# Patient Record
Sex: MALE | Race: OTHER | HISPANIC OR LATINO | URBAN - METROPOLITAN AREA
[De-identification: names, ages, dates, MRNs, and addresses within clinical notes are randomized per-mention and may not be internally consistent; named-entity substitution may affect disease eponyms.]

---

## 2019-10-22 ENCOUNTER — EMERGENCY (EMERGENCY)
Facility: HOSPITAL | Age: 24
LOS: 1 days | Discharge: HOME | End: 2019-10-22
Attending: STUDENT IN AN ORGANIZED HEALTH CARE EDUCATION/TRAINING PROGRAM
Payer: SELF-PAY

## 2019-10-22 VITALS
OXYGEN SATURATION: 98 % | TEMPERATURE: 97 F | DIASTOLIC BLOOD PRESSURE: 65 MMHG | HEART RATE: 64 BPM | SYSTOLIC BLOOD PRESSURE: 129 MMHG | RESPIRATION RATE: 17 BRPM | HEIGHT: 70 IN | WEIGHT: 149.91 LBS

## 2019-10-22 PROCEDURE — 87591 N.GONORRHOEAE DNA AMP PROB: CPT

## 2019-10-22 PROCEDURE — 99285 EMERGENCY DEPT VISIT HI MDM: CPT

## 2019-10-22 PROCEDURE — 93005 ELECTROCARDIOGRAM TRACING: CPT

## 2019-10-22 PROCEDURE — 99283 EMERGENCY DEPT VISIT LOW MDM: CPT | Mod: 25

## 2019-10-22 PROCEDURE — 87491 CHLMYD TRACH DNA AMP PROBE: CPT

## 2019-10-22 RX ORDER — MECLIZINE HCL 12.5 MG
1 TABLET ORAL
Qty: 21 | Refills: 0
Start: 2019-10-22 | End: 2019-10-28

## 2019-10-22 RX ORDER — MECLIZINE HCL 12.5 MG
25 TABLET ORAL ONCE
Refills: 0 | Status: COMPLETED | OUTPATIENT
Start: 2019-10-22 | End: 2019-10-22

## 2019-10-22 RX ADMIN — Medication 25 MILLIGRAM(S): at 17:30

## 2019-10-22 NOTE — ED PROVIDER NOTE - OBJECTIVE STATEMENT
24 y.o presenting with vertigo, room spinning sensation x 1.5 yr. endorses worse in the past week. denies medication use, chest pain, sob, n, v, abd pain. endorses that he has left ear tinnitus. denies focal weakness, numbness. also endorse that he wanted his urine tested for std. denies penile discharge or pain. endorses that he does not want HIV testing.

## 2019-10-22 NOTE — ED PROVIDER NOTE - CLINICAL SUMMARY MEDICAL DECISION MAKING FREE TEXT BOX
Patient presenting with vertigo, history of similar in the past,. neuro intact. will treat vertigo, ekg, ro acs. ed obs and reassess

## 2019-10-22 NOTE — ED PROVIDER NOTE - PATIENT PORTAL LINK FT
You can access the FollowMyHealth Patient Portal offered by [facility] by registering at the following website: [email or web address]. By joining [employer]’s FollowMyHealth portal, you will also be able to view your health information using other applications (apps) compatible with our system.

## 2019-10-23 LAB
C TRACH RRNA SPEC QL NAA+PROBE: SIGNIFICANT CHANGE UP
N GONORRHOEA RRNA SPEC QL NAA+PROBE: SIGNIFICANT CHANGE UP
SPECIMEN SOURCE: SIGNIFICANT CHANGE UP

## 2022-10-05 ENCOUNTER — EMERGENCY (EMERGENCY)
Facility: HOSPITAL | Age: 27
LOS: 1 days | Discharge: HOME | End: 2022-10-05
Attending: EMERGENCY MEDICINE
Payer: COMMERCIAL

## 2022-10-05 VITALS
HEIGHT: 69 IN | TEMPERATURE: 99 F | HEART RATE: 61 BPM | DIASTOLIC BLOOD PRESSURE: 68 MMHG | WEIGHT: 149.91 LBS | OXYGEN SATURATION: 100 % | RESPIRATION RATE: 18 BRPM | SYSTOLIC BLOOD PRESSURE: 106 MMHG

## 2022-10-05 VITALS
TEMPERATURE: 99 F | RESPIRATION RATE: 18 BRPM | SYSTOLIC BLOOD PRESSURE: 99 MMHG | DIASTOLIC BLOOD PRESSURE: 56 MMHG | OXYGEN SATURATION: 99 % | HEART RATE: 60 BPM

## 2022-10-05 PROCEDURE — 73080 X-RAY EXAM OF ELBOW: CPT

## 2022-10-05 PROCEDURE — 73090 X-RAY EXAM OF FOREARM: CPT

## 2022-10-05 PROCEDURE — 73090 X-RAY EXAM OF FOREARM: CPT | Mod: 26,RT

## 2022-10-05 PROCEDURE — 24650 CLTX RDL HEAD/NCK FX WO MNPJ: CPT | Mod: 54

## 2022-10-05 PROCEDURE — 73060 X-RAY EXAM OF HUMERUS: CPT

## 2022-10-05 PROCEDURE — 99284 EMERGENCY DEPT VISIT MOD MDM: CPT | Mod: 25

## 2022-10-05 PROCEDURE — 73110 X-RAY EXAM OF WRIST: CPT

## 2022-10-05 PROCEDURE — 73060 X-RAY EXAM OF HUMERUS: CPT | Mod: 26,RT

## 2022-10-05 PROCEDURE — 73080 X-RAY EXAM OF ELBOW: CPT | Mod: 26,RT

## 2022-10-05 PROCEDURE — 73110 X-RAY EXAM OF WRIST: CPT | Mod: 26,RT

## 2022-10-05 PROCEDURE — 99284 EMERGENCY DEPT VISIT MOD MDM: CPT | Mod: 57

## 2022-10-05 RX ORDER — ACETAMINOPHEN 500 MG
650 TABLET ORAL ONCE
Refills: 0 | Status: COMPLETED | OUTPATIENT
Start: 2022-10-05 | End: 2022-10-05

## 2022-10-05 RX ADMIN — Medication 650 MILLIGRAM(S): at 16:59
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
## 2022-10-05 NOTE — ED PROVIDER NOTE - NSFOLLOWUPINSTRUCTIONS_ED_ALL_ED_FT
YOU WERE SEEN IN THE ED FOR: right upper extremity pain.    WHILE YOU WERE HERE, YOU HAD: repeat xrays of your right humerus, elbow, forearm and wrist.  You were found to have a suspected fracture of the radial aspect of the radial head.  This is the preliminary result of your Xray.  If there is any change between the preliminary result and the final result, you will be contacted.    FOR PAIN, YOU MAY TAKE TYLENOL (Acetaminophen) AND/OR IBUPROFEN (Advil or Motrin). FOLLOW THE INSTRUCTIONS ON THE LABEL/CONTAINER.  While here you received 650mg of Tylenol. Do not exceed 4000 mg of Tylenol in a 24 hour period.      PLEASE FOLLOW UP WITH ORTHOPEDIST.  PLEASE CALL FIRST THING TOMORROW TO SET UP FOLLOW UP.  BRING COPIES OF YOUR RESULTS.    RETURN TO THE EMERGENCY DEPARTMENT IF YOU EXPERIENCE ANY NEW/CONCERNING/WORSENING SYMPTOMS SUCH AS BUT NOT LIMITED TO: YOU WERE SEEN IN THE ED FOR: right upper extremity pain.    WHILE YOU WERE HERE, YOU HAD: repeat xrays of your right humerus, elbow, forearm and wrist.  You were found to have a suspected fracture of the radial aspect of the radial head.  This is the preliminary result of your Xray.  If there is any change between the preliminary result and the final result, you will be contacted.    FOR PAIN, YOU MAY TAKE TYLENOL (Acetaminophen) AND/OR IBUPROFEN (Advil or Motrin). FOLLOW THE INSTRUCTIONS ON THE LABEL/CONTAINER.  While here you received 650mg of Tylenol. Do not exceed 4000 mg of Tylenol in a 24 hour period.      PLEASE FOLLOW UP WITH ORTHOPEDIST.  PLEASE CALL FIRST THING TOMORROW TO SET UP FOLLOW UP.  BRING COPIES OF YOUR RESULTS.    RETURN TO THE EMERGENCY DEPARTMENT IF YOU EXPERIENCE ANY NEW/CONCERNING/WORSENING SYMPTOMS SUCH AS BUT NOT LIMITED TO: severe pain, numbness, tingling, weakness.
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
## 2022-10-05 NOTE — ED PROVIDER NOTE - NSFOLLOWUPCLINICS_GEN_ALL_ED_FT
[facility] Orthopedic Surgery  Orthopedic Surgery  [address], 3rd & 4th [place] [address]  Phone: [phone]  Fax:     [facility]  Orthopedic Surgery  [address] [address]  Phone: [phone]  Fax:      [facility] Orthopedic Surgery  Orthopedic Surgery  [address], 3rd & 4th [place] [address]  Phone: [phone]  Fax:     [facility]  Orthopedic Surgery  [address] [address]  Phone: [phone]  Fax:      [facility] Orthopedic Surgery  Orthopedic Surgery  [address], 3rd & 4th [place] [address]  Phone: [phone]  Fax:     [facility]  Orthopedic Surgery  [address] [address]  Phone: [phone]  Fax:
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
## 2022-10-05 NOTE — ED PROVIDER NOTE - NSPTACCESSSVCSAPPT_ED_ALL_ED
Refilled times 1 but needs appointment before the next prescription will be filled.  Please call patient and set up a physical exam. Specialty Care (Immediate)...

## 2022-10-05 NOTE — ED PROVIDER NOTE - PROGRESS NOTE DETAILS
Attending MD Quinteros: XRays noted, results discussed with patient, will place in sling and give Ortho follow up.  Stable for discharge. Follow up instructions given, importance of follow up emphasized, return to ED parameters reviewed and patient verbalized understanding.  All questions answered, all concerns addressed. Tam, PGY4 – x-ray with radial head fx, pt placed in sling, instructed to flex/extend elbow a few times per day to prevent elbow stiffness, knows to f/u with orthopedic surgeon.  Results were discussed with patient as well as return precautions and follow up plan with PCP and/or specialist. Time was taken to answer any questions that the patient had before providing them with discharge paperwork.

## 2022-10-05 NOTE — ED PROVIDER NOTE - ADDITIONAL NOTES AND INSTRUCTIONS:
Mr. Isabel was evaluated in the ER. He is to follow up with an orthopedic surgeon for further recommendations.    -Dr. Jennifer Turcios

## 2022-10-05 NOTE — ED ADULT NURSE NOTE - NSIMPLEMENTINTERV_GEN_ALL_ED
Implemented All Universal Safety Interventions:  [place] to call system. Call bell, personal items and telephone within reach. Instruct patient to call for assistance. Room bathroom lighting operational. Non-slip footwear when patient is off stretcher. Physically safe environment: no spills, clutter or unnecessary equipment. Stretcher in lowest position, wheels locked, appropriate side rails in place. Implemented All Universal Safety Interventions:  [place] to call system. Call bell, personal items and telephone within reach. Instruct patient to call for assistance. Room bathroom lighting operational. Non-slip footwear when patient is off stretcher. Physically safe environment: no spills, clutter or unnecessary equipment. Stretcher in lowest position, wheels locked, appropriate side rails in place. Implemented All Universal Safety Interventions:  [place] to call system. Call bell, personal items and telephone within reach. Instruct patient to call for assistance. Room bathroom lighting operational. Non-slip footwear when patient is off stretcher. Physically safe environment: no spills, clutter or unnecessary equipment. Stretcher in lowest position, wheels locked, appropriate side rails in place.
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
## 2022-10-05 NOTE — ED PROVIDER NOTE - PHYSICAL EXAMINATION
I have reviewed the triage vital signs.  Const: AAOx3, in NAD  Eyes: no conjunctival injection  HENT: NCAT, Neck supple, oral mucosa moist  CV: RRR, +S1, S2  Resp: CTAB, no respiratory distress  GI: Abdomen soft, NTND, no guarding  : no CVA tenderness  Extremities: No peripheral edema  Skin: Warm, well perfused, no rash  MSK: No gross deformities appreciated. No bony TTP. No AC/SC joint TTP. No snuffbox/scaphoid tubercle, no pain with axial loading of R thumb. RUE compartments soft. R Bicep contour appears normal/symmetric to L bicep. Pt appears to have pain with RUE supination & elbow flexion. Strength grossly normal, but limited by pain.  Neuro: No focal sensory or motor deficits  Psych: Appropriate mood and affect
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
## 2022-10-05 NOTE — ED PROVIDER NOTE - PATIENT PORTAL LINK FT
You can access the FollowMyHealth Patient Portal offered by [facility] by registering at the following website: [email or web address]. By joining [employer]’s FollowMyHealth portal, you will also be able to view your health information using other applications (apps) compatible with our system. You can access the FollowMyHealth Patient Portal offered by [facility] by registering at the following website: [email or web address]. By joining [employer]’s FollowMyHealth portal, you will also be able to view your health information using other applications (apps) compatible with our system. You can access the FollowMyHealth Patient Portal offered by [facility] by registering at the following website: [email or web address]. By joining [employer]’s FollowMyHealth portal, you will also be able to view your health information using other applications (apps) compatible with our system.

## 2023-03-19 ENCOUNTER — EMERGENCY (EMERGENCY)
Facility: HOSPITAL | Age: 28
LOS: 1 days | Discharge: HOME | End: 2023-03-19
Attending: EMERGENCY MEDICINE
Payer: COMMERCIAL

## 2023-03-19 VITALS
HEIGHT: 70 IN | TEMPERATURE: 98 F | OXYGEN SATURATION: 100 % | SYSTOLIC BLOOD PRESSURE: 124 MMHG | HEART RATE: 89 BPM | RESPIRATION RATE: 18 BRPM | WEIGHT: 149.91 LBS | DIASTOLIC BLOOD PRESSURE: 58 MMHG

## 2023-03-19 DIAGNOSIS — Z98.890 OTHER SPECIFIED POSTPROCEDURAL STATES: Chronic | ICD-10-CM

## 2023-03-19 LAB
ACETONE SERPL-MCNC: NEGATIVE — SIGNIFICANT CHANGE UP
ALBUMIN SERPL ELPH-MCNC: 4.2 G/DL — SIGNIFICANT CHANGE UP (ref 3.5–5)
ALP SERPL-CCNC: 53 U/L — SIGNIFICANT CHANGE UP (ref 40–120)
ALT FLD-CCNC: 32 U/L DA — SIGNIFICANT CHANGE UP (ref 10–60)
ANION GAP SERPL CALC-SCNC: 11 MMOL/L — SIGNIFICANT CHANGE UP (ref 5–17)
AST SERPL-CCNC: 22 U/L — SIGNIFICANT CHANGE UP (ref 10–40)
BASOPHILS # BLD AUTO: 0.08 K/UL — SIGNIFICANT CHANGE UP (ref 0–0.2)
BASOPHILS NFR BLD AUTO: 0.8 % — SIGNIFICANT CHANGE UP (ref 0–2)
BILIRUB SERPL-MCNC: 1 MG/DL — SIGNIFICANT CHANGE UP (ref 0.2–1.2)
BUN SERPL-MCNC: 14 MG/DL — SIGNIFICANT CHANGE UP (ref 7–18)
CALCIUM SERPL-MCNC: 9.3 MG/DL — SIGNIFICANT CHANGE UP (ref 8.4–10.5)
CHLORIDE SERPL-SCNC: 104 MMOL/L — SIGNIFICANT CHANGE UP (ref 96–108)
CK SERPL-CCNC: 244 U/L — HIGH (ref 35–232)
CO2 SERPL-SCNC: 25 MMOL/L — SIGNIFICANT CHANGE UP (ref 22–31)
CREAT SERPL-MCNC: 0.99 MG/DL — SIGNIFICANT CHANGE UP (ref 0.5–1.3)
EGFR: 107 ML/MIN/1.73M2 — SIGNIFICANT CHANGE UP
EOSINOPHIL # BLD AUTO: 0.06 K/UL — SIGNIFICANT CHANGE UP (ref 0–0.5)
EOSINOPHIL NFR BLD AUTO: 0.6 % — SIGNIFICANT CHANGE UP (ref 0–6)
GLUCOSE SERPL-MCNC: 106 MG/DL — HIGH (ref 70–99)
HCT VFR BLD CALC: 48 % — SIGNIFICANT CHANGE UP (ref 39–50)
HGB BLD-MCNC: 16.4 G/DL — SIGNIFICANT CHANGE UP (ref 13–17)
IMM GRANULOCYTES NFR BLD AUTO: 0.5 % — SIGNIFICANT CHANGE UP (ref 0–0.9)
LYMPHOCYTES # BLD AUTO: 5.45 K/UL — HIGH (ref 1–3.3)
LYMPHOCYTES # BLD AUTO: 57.4 % — HIGH (ref 13–44)
MAGNESIUM SERPL-MCNC: 2 MG/DL — SIGNIFICANT CHANGE UP (ref 1.6–2.6)
MCHC RBC-ENTMCNC: 31.3 PG — SIGNIFICANT CHANGE UP (ref 27–34)
MCHC RBC-ENTMCNC: 34.2 GM/DL — SIGNIFICANT CHANGE UP (ref 32–36)
MCV RBC AUTO: 91.6 FL — SIGNIFICANT CHANGE UP (ref 80–100)
MONOCYTES # BLD AUTO: 0.92 K/UL — HIGH (ref 0–0.9)
MONOCYTES NFR BLD AUTO: 9.7 % — SIGNIFICANT CHANGE UP (ref 2–14)
NEUTROPHILS # BLD AUTO: 2.94 K/UL — SIGNIFICANT CHANGE UP (ref 1.8–7.4)
NEUTROPHILS NFR BLD AUTO: 31 % — LOW (ref 43–77)
NRBC # BLD: 0 /100 WBCS — SIGNIFICANT CHANGE UP (ref 0–0)
PLATELET # BLD AUTO: 242 K/UL — SIGNIFICANT CHANGE UP (ref 150–400)
POTASSIUM SERPL-MCNC: 3.2 MMOL/L — LOW (ref 3.5–5.3)
POTASSIUM SERPL-SCNC: 3.2 MMOL/L — LOW (ref 3.5–5.3)
PROT SERPL-MCNC: 7 G/DL — SIGNIFICANT CHANGE UP (ref 6–8.3)
RBC # BLD: 5.24 M/UL — SIGNIFICANT CHANGE UP (ref 4.2–5.8)
RBC # FLD: 12.9 % — SIGNIFICANT CHANGE UP (ref 10.3–14.5)
SODIUM SERPL-SCNC: 140 MMOL/L — SIGNIFICANT CHANGE UP (ref 135–145)
TROPONIN I, HIGH SENSITIVITY RESULT: 3 NG/L — SIGNIFICANT CHANGE UP
WBC # BLD: 9.79 K/UL — SIGNIFICANT CHANGE UP (ref 3.8–10.5)
WBC # FLD AUTO: 9.79 K/UL — SIGNIFICANT CHANGE UP (ref 3.8–10.5)

## 2023-03-19 PROCEDURE — 99285 EMERGENCY DEPT VISIT HI MDM: CPT

## 2023-03-19 RX ORDER — SODIUM CHLORIDE 9 MG/ML
2000 INJECTION INTRAMUSCULAR; INTRAVENOUS; SUBCUTANEOUS ONCE
Refills: 0 | Status: COMPLETED | OUTPATIENT
Start: 2023-03-19 | End: 2023-03-19

## 2023-03-19 RX ORDER — MECLIZINE HCL 12.5 MG
12.5 TABLET ORAL ONCE
Refills: 0 | Status: DISCONTINUED | OUTPATIENT
Start: 2023-03-19 | End: 2023-03-19

## 2023-03-19 RX ORDER — METOCLOPRAMIDE HCL 10 MG
10 TABLET ORAL ONCE
Refills: 0 | Status: COMPLETED | OUTPATIENT
Start: 2023-03-19 | End: 2023-03-19

## 2023-03-19 RX ORDER — ONDANSETRON 8 MG/1
4 TABLET, FILM COATED ORAL ONCE
Refills: 0 | Status: COMPLETED | OUTPATIENT
Start: 2023-03-19 | End: 2023-03-19

## 2023-03-19 RX ADMIN — Medication 10 MILLIGRAM(S): at 20:27

## 2023-03-19 RX ADMIN — Medication 1 MILLIGRAM(S): at 19:03

## 2023-03-19 RX ADMIN — Medication 104 MILLIGRAM(S): at 19:57

## 2023-03-19 RX ADMIN — SODIUM CHLORIDE 2000 MILLILITER(S): 9 INJECTION INTRAMUSCULAR; INTRAVENOUS; SUBCUTANEOUS at 20:03

## 2023-03-19 RX ADMIN — ONDANSETRON 4 MILLIGRAM(S): 8 TABLET, FILM COATED ORAL at 19:57

## 2023-03-19 RX ADMIN — SODIUM CHLORIDE 2000 MILLILITER(S): 9 INJECTION INTRAMUSCULAR; INTRAVENOUS; SUBCUTANEOUS at 19:03

## 2023-03-19 NOTE — ED PROVIDER NOTE - PATIENT PORTAL LINK FT
You can access the FollowMyHealth Patient Portal offered by [facility] by registering at the following website: [email or web address]. By joining [employer]’s FollowMyHealth portal, you will also be able to view your health information using other applications (apps) compatible with our system.

## 2023-03-19 NOTE — ED PROVIDER NOTE - CARE PLAN
1 Principal Discharge DX:	Vertigo  Secondary Diagnosis:	Panic attack   Principal Discharge DX:	Vertigo  Secondary Diagnosis:	Panic attack  Secondary Diagnosis:	Hypokalemia

## 2023-03-19 NOTE — ED PROVIDER NOTE - NEUROLOGICAL, MLM
Alert and oriented, no focal deficits, no motor or sensory deficits.  unable to get up, feeling very dizzy

## 2023-03-19 NOTE — ED PROVIDER NOTE - NSFOLLOWUPINSTRUCTIONS_ED_ALL_ED_FT
Benign Paroxysmal Positional Vertigo    WHAT YOU NEED TO KNOW:    BPPV is an inner ear condition that causes you to suddenly feel dizzy. Benign means it is not serious or life-threatening. BPPV is caused by a problem with the nerves and structure of your inner ear. BPPV happens when small pieces of calcium break loose and lump together in one of your inner ear canals. Ear Anatomy         DISCHARGE INSTRUCTIONS:    Return to the emergency department if:     You fall during a BPPV episode and are injured.      You have a severe headache that does not go away.      You have new changes in your vision or feel weak or confused.      You have problems hearing, or you have ringing or buzzing in your ears.    Contact your healthcare provider if:     Your BPPV symptoms do not go away or they return.      You have problems with your balance, or you are falling often.      You have new or increased nausea or vomiting with vertigo.      You feel anxious or depressed and do not want to leave your home.      You have questions or concerns about your condition or care.    Medicines:     Medicines may be recommended or prescribed to treat dizziness or nausea.      Take your medicine as directed. Contact your healthcare provider if you think your medicine is not helping or if you have side effects. Tell him of her if you are allergic to any medicine. Keep a list of the medicines, vitamins, and herbs you take. Include the amounts, and when and why you take them. Bring the list or the pill bottles to follow-up visits. Carry your medicine list with you in case of an emergency.    Prevent your symptoms:     Try to avoid sudden head movements. Stand up and lie down slowly.       Raise and support your head when you lie down. Place pillows under your upper back and head or rest in a recliner.       Change your position often when you are lying down. Try not to lie with your head on the same side for long periods of time. Roll over slowly.       Wear protective gear when you ride a bike or play sports. A helmet helps protect your head from injury.    Follow up with your healthcare provider as directed: You may need to return in 1 month to check the progress of your treatment. Write down your questions so you remember to ask them during your visits.        Panic Attack      A panic attack is a sudden episode of severe anxiety, fear, or discomfort that causes physical and emotional symptoms. A panic attack may be in response to something frightening, or it may occur for no known reason.    Symptoms of a panic attack can be similar to symptoms of a heart attack or stroke. It is important to see your health care provider when you have a panic attack so that these conditions can be ruled out.      What are the causes?    A panic attack may be caused by:  •An extreme, life-threatening situation, such as a war or natural disaster.      •An anxiety disorder, such as post-traumatic stress disorder.      •Depression.      •Panic disorder.      •Certain medical conditions, including heart problems, neurological conditions, and infections.      Other causes may include:  •Certain over-the-counter and prescription medicines.      •Supplements that increase anxiety.      •Illegal drugs that increase heart rate and blood pressure, such as methamphetamine.        What increases the risk?    You are more likely to develop this condition if:  •You have another mental health condition.      •You use alcohol, illegal drugs, or other substances.      •You are under extreme stress.      •A life event is causing increased feelings of anxiety and depression.        What are the signs or symptoms?    A panic attack starts suddenly, usually lasts 5–10 minutes, and occurs with one or more of the following:  •A pounding heart, or a feeling that your heart is beating irregularly or faster than normal (palpitations).      •Sweating, trembling, or shaking.      •Shortness of breath, feeling smothered, or feeling choked.      •Chest pain or discomfort.      •Nausea or a strange feeling in your stomach.      •Dizziness, feeling light-headed, or feeling like you might faint.      Other symptoms may include:  •Chills or hot flashes.      •Numbness or tingling in your lips, hands, or feet.      •Feeling confused, or feeling that you are not yourself.      •Fear of losing control or of being emotionally unstable, or fear of dying.        How is this diagnosed?  Doctor speaking with a patient in the doctor's office.   A panic attack is diagnosed with an assessment by your health care provider. During the assessment, your health care provider will ask questions about:  •Your history of anxiety, depression, and panic attacks.      •Your medical history.      •Whether you drink alcohol, use drugs, take supplements, or take medicines. Be honest about your substance use.      Your health care provider may also:  •Order blood tests or other kinds of tests to rule out serious medical conditions.      •Refer you to a mental health professional for further evaluation.        How is this treated?    A panic attack is a symptom of another condition. Treatment depends on the cause of the panic attack.  •If the cause is a medical problem, your health care provider will treat that problem or refer you to a specialist.      •If the cause is emotional, you may be given anti-anxiety medicines or referred to a counselor. Anti-anxiety medicines may reduce how often attacks happen, reduce how severe the attacks are, and lower anxiety.      •If the cause is a medicine, your health care provider may tell you to stop the medicine, change your dose, or take a different medicine.      •If the cause is an illegal drug, treatment may involve letting the drug wear off and taking medicine to help the drug leave your body or to stop its effects. Attacks caused by heavy drug use may continue even if you stop using the drug.      Most panic attacks go away with treatment of the underlying problem. If you have panic attacks often, you may have a condition called panic disorder.      Follow these instructions at home:    Alcohol use   • Do not drink alcohol if:  •Your health care provider tells you not to drink.      •You are pregnant, may be pregnant, or are planning to become pregnant.      •If you drink alcohol:•Limit how much you have to:  •0–1 drink a day for women.      •0–2 drinks a day for men.        •Know how much alcohol is in your drink. In the [place] one drink equals one 12 oz bottle of beer (355 mL), one 5 oz glass of wine (148 mL), or one 1½ oz glass of hard liquor (44 mL).        General instructions     •Take over-the-counter and prescription medicines only as told by your health care provider.      •If you feel anxious, limit your caffeine intake.    •Take good care of your physical and mental health by:  •Eating a balanced diet that includes plenty of fresh fruits and vegetables, whole grains, lean meats, and low-fat dairy.      •Getting plenty of rest. Try to get 7–8 hours of uninterrupted sleep each night.      •Exercising regularly. Try to get 30 minutes of physical activity at least 5 days a week.        • Do not use any products that contain nicotine or tobacco. These products include cigarettes, chewing tobacco, and vaping devices, such as e-cigarettes. If you need help quitting, ask your health care provider.      •Keep all follow-up visits. This is important. Panic attacks may have underlying physical or emotional problems that take time to accurately diagnose.        Where to find more information    •Substance Abuse and Mental Health Services Administration (SAMHSA): samhsa.gov      •National [place] of Mental Health ([facility]): www.nimh.nih.gov        Contact a health care provider if:    •Your symptoms do not improve, or they get worse.      •You are not able to take your medicine as prescribed because of side effects.        Get help right away if:    •You have thoughts about hurting yourself or others.      Get help right away if you feel like you may hurt yourself or others, or have thoughts about taking your own life. Go to your nearest emergency room or:   • Call 911.       • Call the National Suicide Prevention Lifeline at 1-747.129.5963 or 983. This is open 24 hours a day.       • Text the Crisis Text Line at 857070.         Summary    •A panic attack is a sudden episode of severe anxiety, fear, or discomfort that causes physical and emotional symptoms.      •Always see a health care provider to have the reasons for the panic attack correctly diagnosed.      •If your panic attack was caused by a physical problem, follow your health care provider's suggestions for medicine, referral to a specialist, and lifestyle changes.      •If your panic attack was caused by an emotional problem, follow through with counseling from a qualified mental health specialist.      •If you feel like you may hurt yourself or others, call 911 and get help right away.      This information is not intended to replace advice given to you by your health care provider. Make sure you discuss any questions you have with your health care provider.      Neurology outpatient referral

## 2023-03-19 NOTE — ED PROVIDER NOTE - OBJECTIVE STATEMENT
27-year-old male with history of vertigo claims he suddenly felt dizzy at work.  Patient left work, as he was walking towards the bus stop, patient developed vertigo, along with palpitations, clawhand, generalized stiffness, hyperventilation, tingling of his hands.  Patient vomited in the ED few times.  Denies recent cold or ear infection.

## 2023-03-19 NOTE — ED ADULT TRIAGE NOTE - CHIEF COMPLAINT QUOTE
c/o sudden onset of " vertigo " while walking from work " panicked " hyperventilating , vomiting , crying in triage

## 2023-03-19 NOTE — ED ADULT NURSE NOTE - NSFALLRSKUNASSIST_ED_ALL_ED
TRANSITIONAL CARE MANAGEMENT - HOSPITAL DISCHARGE FOLLOW-UP    Contacted Ms. 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 She was discharged from the hospital on 8-22-18. Review of the After Visit Summary from the recent hospitalization indicates that the patient needs to see pcp in follow up.    She feels that she is doing fairly well at home.   Her diet concern is none. Overall, the patient is eating well.   Ambulation: staying the same  Fever: is not present  Pain: she is experiencing pain across her forehead. The pain is perceived as severe (6-8 pain scale).  Patient has been using her pain medication sparingly but she says that she is going to start using it more frequently so that she is a little more comfortable.    Activities of Daily Living (global): Self-care and her  is there with her and will help her if anything is needed.     Patient states that she does have sufficient family support. She feels that she is able to ask for assistance when needed.     Additional patient/family concerns: None .    Discharge medications weren't verified with the patient. She reports that she spoke with the pharmacist earlier today because she was confused about the medications due to the instructions that she was provided at discharge. She reports that she is not experiencing any medication side effects.    Upon discharge, the patient was to receive physical therapy and occupational therapy. These services have been initiated. These services have been scheduled and no further arrangements are needed at this time.      Patient has an appointment on 8-28-18 with Rolly Hernandez MD. Ms. Nuno was reminded about the importance of keeping this appointment.     
no

## 2023-03-19 NOTE — ED ADULT NURSE NOTE - OBJECTIVE STATEMENT
pt staT pt staTes I was working  when I felt dizzy with history of vertigo ,I panicked and my sister called cab for me to go to hospital,pt hyperventilating and crying and vomited,with headache only when I move,pt states been feeling depress or sad on and off for the past few weeks,no SI/ no HI

## 2023-03-19 NOTE — ED PROVIDER NOTE - PROGRESS NOTE DETAILS
labs explained to pt  Patient's feeling much better, smiling and wants to go home.  Patient has never seen a neurologist for his vertigo, will get patient outpatient referral.  Pt's ambulating with no ataxia

## 2023-03-19 NOTE — ED PROVIDER NOTE - EYES, MLM
Clear bilaterally, pupils equal, round and reactive to light.  EOMI, nystagmus Clear bilaterally, pupils equal, round and reactive to light.  EOMI, horizontal nystagmus

## 2023-03-20 VITALS
DIASTOLIC BLOOD PRESSURE: 64 MMHG | RESPIRATION RATE: 18 BRPM | OXYGEN SATURATION: 99 % | HEART RATE: 65 BPM | TEMPERATURE: 98 F | SYSTOLIC BLOOD PRESSURE: 118 MMHG

## 2023-03-20 PROCEDURE — 82009 KETONE BODYS QUAL: CPT

## 2023-03-20 PROCEDURE — 36415 COLL VENOUS BLD VENIPUNCTURE: CPT

## 2023-03-20 PROCEDURE — 96375 TX/PRO/DX INJ NEW DRUG ADDON: CPT

## 2023-03-20 PROCEDURE — 85025 COMPLETE CBC W/AUTO DIFF WBC: CPT

## 2023-03-20 PROCEDURE — 83735 ASSAY OF MAGNESIUM: CPT

## 2023-03-20 PROCEDURE — 99284 EMERGENCY DEPT VISIT MOD MDM: CPT | Mod: 25

## 2023-03-20 PROCEDURE — 84484 ASSAY OF TROPONIN QUANT: CPT

## 2023-03-20 PROCEDURE — 96365 THER/PROPH/DIAG IV INF INIT: CPT

## 2023-03-20 PROCEDURE — 96361 HYDRATE IV INFUSION ADD-ON: CPT

## 2023-03-20 PROCEDURE — 82550 ASSAY OF CK (CPK): CPT

## 2023-03-20 PROCEDURE — 80053 COMPREHEN METABOLIC PANEL: CPT

## 2023-03-20 RX ORDER — POTASSIUM CHLORIDE 20 MEQ
40 PACKET (EA) ORAL
Refills: 0 | Status: DISCONTINUED | OUTPATIENT
Start: 2023-03-20 | End: 2023-03-23

## 2023-03-20 RX ADMIN — Medication 40 MILLIEQUIVALENT(S): at 01:05

## 2023-03-20 NOTE — ED ADULT NURSE REASSESSMENT NOTE - NS ED NURSE REASSESS COMMENT FT1
Patient is alert and oriented x 3, not in any form of distress. re-evaluated by Dr. Quick with health teaching and instructions rendered. Patient is discharge in stable condition.

## 2023-12-12 NOTE — ED ADULT NURSE NOTE - NS ED NURSE LEVEL OF CONSCIOUSNESS MENTAL STATUS
You may take over the counter Tylenol (Acetaminophen) and/or Motrin (Ibuprofen) as needed, as directed on packaging. Be sure to get plenty of rest, and drinking fluids to remain hydrated. Please follow up with your primary provider in the next several days. Should you have any worsening of symptoms, or lack of improvement please be re-evaluated. If needed for significant concerns, consider 911 or ER evaluation. If you were just recently exposed to someone who was ill with COVID, it may be too early to detect the virus. You should monitor your symptoms, and if you continue to have symptoms or they worsen you can obtain a Home COVID test at your local pharmacy to test again in a few days. Until you are feeling well, you should be sure to use proper hygiene to etiquette to prevent the spread of any illness you may be experiencing regardless of your test results. COVID and Flu are both viral illnesses with similar presentation and both require only symptomatic treatments for most patients. Should you have any questions about your treatment please follow up with your family doctor's office. The unnecessary use of antibiotics can have harmful affect, unwanted side-effects and can lead to antibiotic resistant bacteria in the future. You are being treated today for a viral illness. Viral illnesses do not require antibiotics, and are treated symptomatically. According to the Centers for Disease Control and Prevention, about one-third of antibiotic use in the outpatient setting, is not needed nor appropriate. Antibiotics treat infections caused by bacteria. But they don't treat infections caused by viruses (viral infections). For example, an antibiotic is the correct treatment for strep throat, which is caused by bacteria. But it's not the right treatment for most sore throats, which are caused by viruses. By being proactive and treating your individual symptoms, this may help you feel better.      COVID-19 Key Points  People who are infected but asymptomatic or people with mild COVID-19 should isolate through at least day 5 (day 0 is the day symptoms appeared or the date the specimen was collected for the positive test for people who are asymptomatic). They should wear a mask through day 10. A test-based strategy may be used to remove a mask sooner. People with moderate or severe COVID-19 should isolate through at least day 10. Those with severe COVID-19 may remain infectious beyond 10 days and may need to extend isolation for up to 20 days. People who are moderately or severely immunocompromised should isolate through at least day 20. Use of serial testing and consultation with an infectious disease specialist is recommended in these patients prior to ending isolation. Isolation can be discontinued at least 5 days after symptom onset (day 0 is the day symptoms appeared, and day 1 is the next full day thereafter) if fever has resolved for at least 24 hours (without taking fever-reducing medications) and other symptoms are improving. Loss of taste and smell may persist for weeks or months after recovery and need not delay the end of isolation. A high-quality mask should be worn around others at home and in public through day 10. If symptoms recur or worsen, the isolation period should restart at day 0. Awake

## 2024-04-10 NOTE — ED PROVIDER NOTE - NSFOLLOWUPCLINICSTOKEN_GEN_ALL_ED_FT
Never smoker [id number]: || ||00\01||False;[id number]: || ||00\01||False; [id number]: || ||00\01||False;[id number]: || ||00\01||False; [id number]: || ||00\01||False;[id number]: || ||00\01||False;

## 2025-09-01 ENCOUNTER — EMERGENCY (EMERGENCY)
Facility: HOSPITAL | Age: 30
LOS: 1 days | End: 2025-09-01
Attending: STUDENT IN AN ORGANIZED HEALTH CARE EDUCATION/TRAINING PROGRAM
Payer: COMMERCIAL

## 2025-09-01 VITALS
OXYGEN SATURATION: 98 % | TEMPERATURE: 98 F | SYSTOLIC BLOOD PRESSURE: 125 MMHG | WEIGHT: 160.06 LBS | DIASTOLIC BLOOD PRESSURE: 67 MMHG | HEART RATE: 79 BPM | RESPIRATION RATE: 17 BRPM | HEIGHT: 69 IN

## 2025-09-01 DIAGNOSIS — Z98.890 OTHER SPECIFIED POSTPROCEDURAL STATES: Chronic | ICD-10-CM

## 2025-09-01 PROBLEM — Z78.9 OTHER SPECIFIED HEALTH STATUS: Chronic | Status: ACTIVE | Noted: 2022-10-05

## 2025-09-01 PROBLEM — R42 DIZZINESS AND GIDDINESS: Chronic | Status: ACTIVE | Noted: 2023-03-19

## 2025-09-01 PROCEDURE — 99284 EMERGENCY DEPT VISIT MOD MDM: CPT

## 2025-09-01 RX ORDER — DIAZEPAM 5 MG/1
5 TABLET ORAL ONCE
Refills: 0 | Status: DISCONTINUED | OUTPATIENT
Start: 2025-09-01 | End: 2025-09-01

## 2025-09-01 RX ORDER — METHOCARBAMOL 500 MG/1
2 TABLET, FILM COATED ORAL
Qty: 24 | Refills: 0
Start: 2025-09-01 | End: 2025-09-04

## 2025-09-01 RX ORDER — KETOROLAC TROMETHAMINE 30 MG/ML
15 INJECTION, SOLUTION INTRAMUSCULAR; INTRAVENOUS ONCE
Refills: 0 | Status: DISCONTINUED | OUTPATIENT
Start: 2025-09-01 | End: 2025-09-01

## 2025-09-01 RX ORDER — LIDOCAINE HYDROCHLORIDE 20 MG/ML
1 JELLY TOPICAL ONCE
Refills: 0 | Status: COMPLETED | OUTPATIENT
Start: 2025-09-01 | End: 2025-09-01

## 2025-09-01 RX ORDER — ACETAMINOPHEN 500 MG/5ML
975 LIQUID (ML) ORAL ONCE
Refills: 0 | Status: COMPLETED | OUTPATIENT
Start: 2025-09-01 | End: 2025-09-01

## 2025-09-01 RX ORDER — CYCLOBENZAPRINE HYDROCHLORIDE 15 MG/1
10 CAPSULE, EXTENDED RELEASE ORAL ONCE
Refills: 0 | Status: DISCONTINUED | OUTPATIENT
Start: 2025-09-01 | End: 2025-09-01

## 2025-09-01 RX ORDER — LIDOCAINE HYDROCHLORIDE 20 MG/ML
1 JELLY TOPICAL
Qty: 2 | Refills: 0
Start: 2025-09-01 | End: 2025-09-10

## 2025-09-01 RX ADMIN — LIDOCAINE HYDROCHLORIDE 1 PATCH: 20 JELLY TOPICAL at 17:07

## 2025-09-01 RX ADMIN — Medication 4 MILLIGRAM(S): at 18:39

## 2025-09-01 RX ADMIN — KETOROLAC TROMETHAMINE 15 MILLIGRAM(S): 30 INJECTION, SOLUTION INTRAMUSCULAR; INTRAVENOUS at 17:07

## 2025-09-01 RX ADMIN — DIAZEPAM 5 MILLIGRAM(S): 5 TABLET ORAL at 17:07

## 2025-09-01 RX ADMIN — Medication 975 MILLIGRAM(S): at 17:07

## 2025-09-05 PROBLEM — Z00.00 ENCOUNTER FOR PREVENTIVE HEALTH EXAMINATION: Status: ACTIVE | Noted: 2025-09-05

## 2025-09-08 ENCOUNTER — APPOINTMENT (OUTPATIENT)
Dept: ORTHOPEDIC SURGERY | Facility: CLINIC | Age: 30
End: 2025-09-08
Payer: MEDICAID

## 2025-09-08 VITALS — BODY MASS INDEX: 23.7 KG/M2 | WEIGHT: 160 LBS | HEIGHT: 69 IN

## 2025-09-08 DIAGNOSIS — Z87.898 PERSONAL HISTORY OF OTHER SPECIFIED CONDITIONS: ICD-10-CM

## 2025-09-08 DIAGNOSIS — M54.41 LUMBAGO WITH SCIATICA, LEFT SIDE: ICD-10-CM

## 2025-09-08 DIAGNOSIS — M51.369: ICD-10-CM

## 2025-09-08 DIAGNOSIS — M54.42 LUMBAGO WITH SCIATICA, LEFT SIDE: ICD-10-CM

## 2025-09-08 PROCEDURE — 99204 OFFICE O/P NEW MOD 45 MIN: CPT

## 2025-09-08 PROCEDURE — 72100 X-RAY EXAM L-S SPINE 2/3 VWS: CPT

## 2025-09-09 PROBLEM — M54.42 ACUTE BILATERAL LOW BACK PAIN WITH BILATERAL SCIATICA: Status: ACTIVE | Noted: 2025-09-09

## 2025-09-09 PROBLEM — Z87.898 HISTORY OF VERTIGO: Status: RESOLVED | Noted: 2025-09-09 | Resolved: 2025-09-09

## 2025-09-09 PROBLEM — M51.369 LUMBAR DEGENERATIVE DISC DISEASE: Status: ACTIVE | Noted: 2025-09-09

## 2025-09-12 ENCOUNTER — APPOINTMENT (OUTPATIENT)
Age: 30
End: 2025-09-12